# Patient Record
(demographics unavailable — no encounter records)

---

## 2024-10-24 NOTE — HISTORY OF PRESENT ILLNESS
[FreeTextEntry8] : ONE WEEK NASAL CONGESTION AND SINUS PRESSURE.  NO COUGH.  POSTNASAL DRIP.  NO FEVER.  NO N/V/D.  NO COVID TESTING DONE.  NOT IMPROVING.   TOOK NYQUIL AND SUDAFED NO D/H OR HEMATURIA.  DOES HAVE INCREASED FREQUENCY. STILL NEEDS TO SEE UROLOGY LMP: 10/1/24 TAKING ALLEGRA GETS NERVOUS DOCTORS

## 2024-11-05 NOTE — HISTORY OF PRESENT ILLNESS
[FreeTextEntry1] : 48 y/o F referred to urology for chronic large left renal cyst Known left renal cyst 20 yrs  Also 20 + yr hx microscopic hematuria s/p cystoscopy negative per pt    Pt c/o chronic intermittent bilateral flank pain, moderate to severe intensity which has become more severe in the last 2 weeks. Pt has noticed persistent deep aching pain. Pt has also noticed increase in urinary frequency/nocturia +3x. Denies gross hematuria, dysuria, history of kidney/bladder stones   Pt also c/o 7-year history of stress incontinence and intermittent sensation of incomplete emptying. No treatment.   Reviewed MRI Abdomen ([facility] 8/19/24)-Renal cysts, largest at right upper pole measuring 1.1 cm, simple in appearance. Cystic lesion arising from left adrenal gland measuring 6.9 x7 x 6.7 cm within peripheral wall enhancement without complexity. 0.7 cm nodule on Right adrenal too small to accurately characterize.   Reviewed urine culture 10/24/24-no growth

## 2024-11-05 NOTE — ASSESSMENT
[FreeTextEntry1] : Plan -Refer to Dr. Munoz for further management of adenoma -Refer to urogyn for stress incontinence/urinary frequency   I performed history/review of imaging, discussed treatment plan with patient, agree with above transcription by the CHAPINCITO

## 2024-12-03 NOTE — HEALTH RISK ASSESSMENT
[Little interest or pleasure doing things] : 1) Little interest or pleasure doing things [0] : 1) Little interest or pleasure doing things: Not at all (0) [Feeling down, depressed, or hopeless] : 2) Feeling down, depressed, or hopeless [1] : 2) Feeling down, depressed, or hopeless for several days (1) [PHQ-2 Negative - No further assessment needed] : PHQ-2 Negative - No further assessment needed [I have developed a follow-up plan documented below in the note.] : I have developed a follow-up plan documented below in the note. [[id number]] : 1

## 2024-12-03 NOTE — HISTORY OF PRESENT ILLNESS
[de-identified] : [facility] UROLOGY.  REFERRED TO URO/GYN AND DR. CAMARA. STILL NEEDS TO SEE HEPATOLOGY - REFERRED BY GI. NOT TAKING LIPITOR IN GENERAL DOING OKAY ON LEXAPRO.  A LITTLE DOWN WITH HOLIDAYS APPROACHING BUT OK.  BACK TO WORK.  FEELS MUCH BETTER ON LEXAPRO.  FEELS BETTER ON HIGHER DOSAGE.  NO SUICIDAL/HOMICIDAL IDEATIONS OR PLANS.   LMP: 11/27/24

## 2024-12-09 NOTE — HISTORY OF PRESENT ILLNESS
[FreeTextEntry8] : SINCE FRIDAY MORNING.  COUGH WITH PHLEGM.  POSTNASAL DRIP.  TIRED AND ACHY.  TEMPERATURE UP .5.  TOOK AMOXICILLIN SHE HAD AT HOME ON FRIDAY.  SWAB AT HOME FOR COVID NEGATIVE.  SORE THROAT.  NO N/V/D.  DECREASED APPETITE.  HEADACHE.  NO CHEST PAIN OR SHORTNESS OF BREATH.  USING NASAL SPRAY, TOOK NYQUIL AND TYLENOL.

## 2025-01-16 NOTE — HISTORY OF PRESENT ILLNESS
[de-identified] : RIGHT AXILLA IN  CREASE.  DID AN OTC TREATMENT.  THOUGHT IT WENT AWAY.  CAME BACK BUT HURT.  LOOKS IRRITATE.  NO FEVER.

## 2025-01-16 NOTE — HISTORY OF PRESENT ILLNESS
[de-identified] : RIGHT AXILLA IN  CREASE.  DID AN OTC TREATMENT.  THOUGHT IT WENT AWAY.  CAME BACK BUT HURT.  LOOKS IRRITATE.  NO FEVER.

## 2025-03-16 NOTE — ASSESSMENT
[FreeTextEntry1] : Reviewed all diagnostic testing with patient. Personally reviewed the images. Large adrenal cyst, looks benign Plan is to repeat MRI abd w/wo contrast. Patient will follow-up after completion of imaging to discuss next steps.   She is convinced that her back pain is from this and that she wants this to be fixed. All questions answered patient agreeable with plan. 30 min discussion of adrenal cyst evaluation and review of imaging.

## 2025-03-16 NOTE — HISTORY OF PRESENT ILLNESS
[FreeTextEntry1] : 49 yo female presents today as a new patient referred by Dr. Howard for a left adrenal cyst.  C/o intermittent left flank pain and history of microscopic hematuria  MRI abd w/wo contrast on (8.19.24) revealed a cystic lesion which appears to be arising from the left adrenal gland measuring up to 7 cm. No internal complexity or enhancement. Endothelial or pseudocyst including a posttraumatic or postinfectious is possible. Consider urologic follow-up to size. 6 month imaging follow-up could also be considered to ensure stability.

## 2025-03-16 NOTE — ADDENDUM
[FreeTextEntry1] : I, Joyce Garrido assisted in documentation on 03/10/2025 at acting as a scribe for and in the presence of Dr. Spenser Munoz.

## 2025-03-16 NOTE — HISTORY OF PRESENT ILLNESS
[FreeTextEntry1] : 47 yo female presents today as a new patient referred by Dr. Howard for a left adrenal cyst.  C/o intermittent left flank pain and history of microscopic hematuria  MRI abd w/wo contrast on (8.19.24) revealed a cystic lesion which appears to be arising from the left adrenal gland measuring up to 7 cm. No internal complexity or enhancement. Endothelial or pseudocyst including a posttraumatic or postinfectious is possible. Consider urologic follow-up to size. 6 month imaging follow-up could also be considered to ensure stability.

## 2025-03-22 NOTE — HISTORY OF PRESENT ILLNESS
[FreeTextEntry8] : MS. REYES IS A PLEASANT 47 YO PRESENTING FOR ACUTE VISIT.  HAS HAD SYMPTOMS FOR TH EPAST 2 WEEKS.  NO FEVER.  STARTED WITH VOMITING PHLEGM THE FIRST DAY.  THEN COUGH.  HAS MUCOUS ESPECIALLY IN THE MORNING AS WELL AS POSTNASAL DRIP.  NO SORE THROAT.  IRRITATION FROM COUGHING.  WENT TO URGENT CARE LAST TUESDAY.  FLU AND COVID TESTING WERE NEGATIVE.  GIVEN TESSALON PERLES AND NASAL SPRAY.  SAW UROLOGY AND HAVING ADDITIONAL IMAGING PERFORMED OF ADRENAL GLAND.  WILL THEN FOLLOW-UP AGAIN.  LMP: 2 WEEKS AGO  ONE WEEK FUP BP CHECK AND ROUTINE CARE.

## 2025-03-22 NOTE — REVIEW OF SYSTEMS
[Fever] : no fever [Chills] : no chills [Fatigue] : fatigue [Negative] : Gastrointestinal [FreeTextEntry4] : SEE HPI [FreeTextEntry6] : SEE HPI

## 2025-03-22 NOTE — PHYSICAL EXAM
[No Acute Distress] : no acute distress [Well Nourished] : well nourished [Well-Appearing] : well-appearing [Normal Sclera/Conjunctiva] : normal sclera/conjunctiva [PERRL] : pupils equal round and reactive to light [Normal Outer Ear/Nose] : the outer ears and nose were normal in appearance [Normal Oropharynx] : the oropharynx was normal [Normal TMs] : both tympanic membranes were normal [No Lymphadenopathy] : no lymphadenopathy [Supple] : supple [No Respiratory Distress] : no respiratory distress  [No Accessory Muscle Use] : no accessory muscle use [Normal Rate] : normal rate  [Regular Rhythm] : with a regular rhythm [Normal S1, S2] : normal S1 and S2 [de-identified] : LEFT MID/UPPER EXPIRATORY WHEEZE

## 2025-03-22 NOTE — HISTORY OF PRESENT ILLNESS
[FreeTextEntry8] : MS. REYES IS A PLEASANT 49 YO PRESENTING FOR ACUTE VISIT.  HAS HAD SYMPTOMS FOR TH EPAST 2 WEEKS.  NO FEVER.  STARTED WITH VOMITING PHLEGM THE FIRST DAY.  THEN COUGH.  HAS MUCOUS ESPECIALLY IN THE MORNING AS WELL AS POSTNASAL DRIP.  NO SORE THROAT.  IRRITATION FROM COUGHING.  WENT TO URGENT CARE LAST TUESDAY.  FLU AND COVID TESTING WERE NEGATIVE.  GIVEN TESSALON PERLES AND NASAL SPRAY.  SAW UROLOGY AND HAVING ADDITIONAL IMAGING PERFORMED OF ADRENAL GLAND.  WILL THEN FOLLOW-UP AGAIN.  LMP: 2 WEEKS AGO  ONE WEEK FUP BP CHECK AND ROUTINE CARE.

## 2025-03-22 NOTE — PLAN
[FreeTextEntry1] : UROLOGY CONSULTANT NOTE FROM 3/10/25 REVIEWED AS WELL AS MR ABDOMEN FROM 4/8/24 PREGNANCY TEST DECLINED CHECK CHEST XRAY SUPPORTIVE CARE REST, FLUIDS, STEAM RX MEDROL DOSE CALIXTO RX ALBUTEROL PRN AS DIRECTED - REVIEWED USE TO CALL IF SYMPTOMS PERSIST/WORSEN CALL WITH ANY QUESTIONS, CONCERNS OR CHANGES  FOLLOW-UP FOR CHRONIC CARE

## 2025-03-22 NOTE — REVIEW OF SYSTEMS
[Fever] : no fever [Chills] : no chills [Fatigue] : fatigue [Negative] : Gastrointestinal [FreeTextEntry6] : SEE HPI [FreeTextEntry4] : SEE HPI

## 2025-03-22 NOTE — PHYSICAL EXAM
[No Acute Distress] : no acute distress [Well Nourished] : well nourished [Well-Appearing] : well-appearing [Normal Sclera/Conjunctiva] : normal sclera/conjunctiva [PERRL] : pupils equal round and reactive to light [Normal Outer Ear/Nose] : the outer ears and nose were normal in appearance [Normal Oropharynx] : the oropharynx was normal [Normal TMs] : both tympanic membranes were normal [No Lymphadenopathy] : no lymphadenopathy [Supple] : supple [No Respiratory Distress] : no respiratory distress  [No Accessory Muscle Use] : no accessory muscle use [Normal Rate] : normal rate  [Regular Rhythm] : with a regular rhythm [Normal S1, S2] : normal S1 and S2 [de-identified] : LEFT MID/UPPER EXPIRATORY WHEEZE

## 2025-03-26 NOTE — HISTORY OF PRESENT ILLNESS
[de-identified] : 3 WEEKS. TAKING STEROID AND COMPLETED ANTIBIOTIC.  USING INHALER THREE TIMES A DAY COUGH WORSE NO FEVER AT HOME.  DECREASED APPETITE.  NO SWELLING.  HEAVINESS. FEELS SOB DYSPNIC, TACHY, SPORADIC EXP WHEEZE LEFT

## 2025-05-14 NOTE — HISTORY OF PRESENT ILLNESS
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 Denies fatigue, malaise, arthralgias, myalgias, pruritus, recent infection  abdominal pain or distension : ruq Denies jaundice, hematemesis, hematochezia, dark urine, confusion, unintentional weight loss or gain.  Denies family history of sudden death or late trimester miscarriages.

## 2025-07-03 NOTE — HISTORY OF PRESENT ILLNESS
[de-identified] : SEEING DR. CAMARA ON MONDAY. DERAMTOLOGY DR. DIAMOND RECENTLY SEEN NOT ON LIPITOR FOR MONTHS DOING OKAY ON LEXAPRO.  OVERALL GOOD.   DUE TO SEE GYN.   DUE FOR MAMMOGRAM.  HAS NO BREAST COMPLAINTS.  NO PAIN, NIPPLE DISCHARGE, SKIN CHANGES OR LUMPS  3 weeks sore throat.  postnasal drip.  using nasal spray  went to urgent care and given antibiotic amoxicillin.  referred to ent.

## 2025-07-03 NOTE — HEALTH RISK ASSESSMENT
[Little interest or pleasure doing things] : 1) Little interest or pleasure doing things [0] : 1) Little interest or pleasure doing things: Not at all (0) [Feeling down, depressed, or hopeless] : 2) Feeling down, depressed, or hopeless [1] : 2) Feeling down, depressed, or hopeless for several days (1) [PHQ-2 Negative - No further assessment needed] : PHQ-2 Negative - No further assessment needed [[id number]] : 1

## 2025-07-12 NOTE — HISTORY OF PRESENT ILLNESS
[FreeTextEntry1] : 47 y/o female presents today for follow-up for a left adrenal cyst. Referred by Dr. Howard. C/o intermittent left flank pain and history of microscopic hematuria.  She had repeat imaging and is here to review.  MRI abdomen w/wo contrast (6/26/25) showed Left adrenal cystic lesion, 6.8 cm, stable.  No recent changes. Overall doing well. Reports she is still having intermittent mild back pain.

## 2025-07-12 NOTE — ASSESSMENT
[FreeTextEntry1] : Reviewed all diagnostic testing with patient. Personally reviewed the images. Discussed MRI findings - stable Left adrenal cystic lesion, 6.8 cm. No intervention needed at this time. Would recommend observation as this looks benign. Flank pain:   doubt due to adrenal cyst    Will follow Reports she is still having intermittent mild back pain but not bothersome and agreeable to surveillance for now. Plan to repeat MRI abdomen w/wo in 9 months. Next follow up appointment in 9 months. All questions answered and patient agreeable with the plan. 30 min discussion of flank pain and adrenal cyst evaluation and review of imaging. Time spent is for reviewing chart, labs and images (if available), counseling and care coordination.

## 2025-07-12 NOTE — HISTORY OF PRESENT ILLNESS
[FreeTextEntry1] : 49 y/o female presents today for follow-up for a left adrenal cyst. Referred by Dr. Howard. C/o intermittent left flank pain and history of microscopic hematuria.  She had repeat imaging and is here to review.  MRI abdomen w/wo contrast (6/26/25) showed Left adrenal cystic lesion, 6.8 cm, stable.  No recent changes. Overall doing well. Reports she is still having intermittent mild back pain.

## 2025-07-12 NOTE — ADDENDUM
[FreeTextEntry1] : Brittney PANDEY NP, assisted with documentation on 07/03/2025 in the presence and under the direction of Dr. Spenser Munoz.